# Patient Record
Sex: FEMALE | URBAN - METROPOLITAN AREA
[De-identification: names, ages, dates, MRNs, and addresses within clinical notes are randomized per-mention and may not be internally consistent; named-entity substitution may affect disease eponyms.]

---

## 2023-04-12 ENCOUNTER — POST-OPERATIVE VISIT (OUTPATIENT)
Facility: LOCATION | Age: 73
End: 2023-04-12

## 2023-04-12 DIAGNOSIS — Z48.810 ENCOUNTER FOR SURGICAL AFTERCARE FOLLOWING SURGERY ON A SENSE ORGAN: Primary | ICD-10-CM

## 2023-04-12 PROCEDURE — 99024 POSTOP FOLLOW-UP VISIT: CPT | Performed by: OPTOMETRIST

## 2023-04-12 ASSESSMENT — VISUAL ACUITY
OS: 20/20
OD: 20/20

## 2023-04-12 ASSESSMENT — KERATOMETRY: OD: 41.44

## 2023-04-12 NOTE — IMPRESSION/PLAN
Impression: S/P CM PRK [facility] OD OD - 152 Days. Encounter for surgical aftercare following surgery on a sense organ  Z48.810. 
s/p PRK OD after CEIOL. Residual prescription - cma Plan: reviewed with pt hyperopic treatment and expected myopic phase. appears that residual myopia is persistent instead of regressing as expected. Glasses rx released. Continue  artificial tears, Vitamin C, 100% UV A and B sunglasses while outdoors. RTC 2-3 months, check VA (OD, OS, OU), Ks, prism balanced MR, IOP and exam.
Once stable x 4 months consider enhancement to improve vision. RTC PRN decrease VA, increase pain, redness, discharge, photophobia, flashes/floaters or other vision problems.

## 2023-08-04 ENCOUNTER — OFFICE VISIT (OUTPATIENT)
Facility: LOCATION | Age: 73
End: 2023-08-04
Payer: COMMERCIAL

## 2023-08-04 DIAGNOSIS — H16.223 KERATOCONJUNCT SICCA, NOT SPECIFIED AS SJOGREN'S, BILATERAL: ICD-10-CM

## 2023-08-04 DIAGNOSIS — H50.05 ALTERNATING ESOTROPIA: ICD-10-CM

## 2023-08-04 DIAGNOSIS — H53.2 DIPLOPIA: Primary | ICD-10-CM

## 2023-08-04 DIAGNOSIS — H18.49 OTHER CORNEAL DEGENERATION: ICD-10-CM

## 2023-08-04 DIAGNOSIS — H04.123 DRY EYE SYNDROME OF BILATERAL LACRIMAL GLANDS: ICD-10-CM

## 2023-08-04 PROCEDURE — 99214 OFFICE O/P EST MOD 30 MIN: CPT | Performed by: OPHTHALMOLOGY

## 2023-08-09 ENCOUNTER — OFFICE VISIT (OUTPATIENT)
Facility: LOCATION | Age: 73
End: 2023-08-09
Payer: COMMERCIAL

## 2023-08-09 DIAGNOSIS — H50.05 ALTERNATING ESOTROPIA: ICD-10-CM

## 2023-08-09 DIAGNOSIS — H16.223 KERATOCONJUNCT SICCA, NOT SPECIFIED AS SJOGREN'S, BILATERAL: ICD-10-CM

## 2023-08-09 DIAGNOSIS — H04.123 DRY EYE SYNDROME OF BILATERAL LACRIMAL GLANDS: ICD-10-CM

## 2023-08-09 DIAGNOSIS — H53.2 DIPLOPIA: Primary | ICD-10-CM

## 2023-08-09 PROCEDURE — 99213 OFFICE O/P EST LOW 20 MIN: CPT | Performed by: OPHTHALMOLOGY

## 2023-08-09 ASSESSMENT — VISUAL ACUITY
OD: 20/20
OS: 20/30

## 2023-09-27 ENCOUNTER — OFFICE VISIT (OUTPATIENT)
Facility: LOCATION | Age: 73
End: 2023-09-27
Payer: COMMERCIAL

## 2023-09-27 DIAGNOSIS — H53.2 DIPLOPIA: Primary | ICD-10-CM

## 2023-09-27 DIAGNOSIS — H50.05 ALTERNATING ESOTROPIA: ICD-10-CM

## 2023-09-27 DIAGNOSIS — H04.123 DRY EYE SYNDROME OF BILATERAL LACRIMAL GLANDS: ICD-10-CM

## 2023-09-27 DIAGNOSIS — H16.223 KERATOCONJUNCT SICCA, NOT SPECIFIED AS SJOGREN'S, BILATERAL: ICD-10-CM

## 2023-09-27 PROCEDURE — 99213 OFFICE O/P EST LOW 20 MIN: CPT | Performed by: OPHTHALMOLOGY

## 2023-09-27 ASSESSMENT — INTRAOCULAR PRESSURE
OD: 10
OS: 10

## 2023-09-27 ASSESSMENT — VISUAL ACUITY
OD: 20/20
OS: 20/20

## 2023-10-04 ENCOUNTER — OFFICE VISIT (OUTPATIENT)
Facility: LOCATION | Age: 73
End: 2023-10-04
Payer: COMMERCIAL

## 2023-10-04 DIAGNOSIS — H04.123 DRY EYE SYNDROME OF BILATERAL LACRIMAL GLANDS: Primary | ICD-10-CM

## 2023-10-04 DIAGNOSIS — H16.223 KERATOCONJUNCT SICCA, NOT SPECIFIED AS SJOGREN'S, BILATERAL: ICD-10-CM

## 2023-10-04 DIAGNOSIS — H18.49 OTHER CORNEAL DEGENERATION: ICD-10-CM

## 2023-10-04 PROCEDURE — 99213 OFFICE O/P EST LOW 20 MIN: CPT | Performed by: OPHTHALMOLOGY

## 2023-10-04 ASSESSMENT — INTRAOCULAR PRESSURE
OS: 12
OD: 12

## 2023-10-18 ENCOUNTER — OFFICE VISIT (OUTPATIENT)
Facility: LOCATION | Age: 73
End: 2023-10-18
Payer: COMMERCIAL

## 2023-10-18 DIAGNOSIS — H18.49 OTHER CORNEAL DEGENERATION: ICD-10-CM

## 2023-10-18 DIAGNOSIS — H04.123 DRY EYE SYNDROME OF BILATERAL LACRIMAL GLANDS: Primary | ICD-10-CM

## 2023-10-18 ASSESSMENT — VISUAL ACUITY
OS: 20/20
OD: 20/20

## 2023-11-22 ENCOUNTER — Encounter (OUTPATIENT)
Facility: LOCATION | Age: 73
End: 2023-11-22

## 2023-11-30 ENCOUNTER — Encounter (OUTPATIENT)
Facility: LOCATION | Age: 73
End: 2023-11-30
Payer: COMMERCIAL

## 2023-12-01 ENCOUNTER — POST-OPERATIVE VISIT (OUTPATIENT)
Facility: LOCATION | Age: 73
End: 2023-12-01

## 2023-12-01 PROCEDURE — 99024 POSTOP FOLLOW-UP VISIT: CPT | Performed by: OPTOMETRIST

## 2023-12-04 ENCOUNTER — POST-OPERATIVE VISIT (OUTPATIENT)
Facility: LOCATION | Age: 73
End: 2023-12-04

## 2023-12-04 DIAGNOSIS — Z48.810 ENCOUNTER FOR SURGICAL AFTERCARE FOLLOWING SURGERY ON A SENSE ORGAN: Primary | ICD-10-CM

## 2023-12-04 PROCEDURE — 99024 POSTOP FOLLOW-UP VISIT: CPT | Performed by: OPTOMETRIST

## 2023-12-05 ENCOUNTER — POST-OPERATIVE VISIT (OUTPATIENT)
Facility: LOCATION | Age: 73
End: 2023-12-05

## 2023-12-05 PROCEDURE — 99024 POSTOP FOLLOW-UP VISIT: CPT | Performed by: OPTOMETRIST

## 2024-01-03 ENCOUNTER — POST-OPERATIVE VISIT (OUTPATIENT)
Facility: LOCATION | Age: 74
End: 2024-01-03

## 2024-01-03 PROCEDURE — 99024 POSTOP FOLLOW-UP VISIT: CPT | Performed by: OPTOMETRIST

## 2024-01-03 ASSESSMENT — KERATOMETRY
OS: 40.94
OD: 40.81

## 2024-01-03 ASSESSMENT — VISUAL ACUITY: OS: 20/25

## 2024-01-03 ASSESSMENT — INTRAOCULAR PRESSURE: OD: 10

## 2024-01-24 ENCOUNTER — OFFICE VISIT (OUTPATIENT)
Facility: LOCATION | Age: 74
End: 2024-01-24
Payer: COMMERCIAL

## 2024-01-24 DIAGNOSIS — H50.312 INTERMITTENT MONOCULAR ESOTROPIA, LEFT EYE: ICD-10-CM

## 2024-01-24 DIAGNOSIS — H53.2 DIPLOPIA: Primary | ICD-10-CM

## 2024-01-24 PROCEDURE — 99213 OFFICE O/P EST LOW 20 MIN: CPT | Performed by: OPHTHALMOLOGY

## 2024-01-24 PROCEDURE — 92134 CPTRZ OPH DX IMG PST SGM RTA: CPT | Performed by: OPHTHALMOLOGY

## 2024-01-24 ASSESSMENT — VISUAL ACUITY
OS: 20/25
OD: 20/30

## 2024-01-24 ASSESSMENT — INTRAOCULAR PRESSURE
OS: 10
OD: 10

## 2024-03-13 ENCOUNTER — OFFICE VISIT (OUTPATIENT)
Facility: LOCATION | Age: 74
End: 2024-03-13
Payer: MEDICARE

## 2024-03-13 DIAGNOSIS — H50.312 INTERMITTENT MONOCULAR ESOTROPIA, LEFT EYE: ICD-10-CM

## 2024-03-13 DIAGNOSIS — H04.123 DRY EYE SYNDROME OF BILATERAL LACRIMAL GLANDS: ICD-10-CM

## 2024-03-13 DIAGNOSIS — H53.2 DIPLOPIA: Primary | ICD-10-CM

## 2024-03-13 PROCEDURE — 99213 OFFICE O/P EST LOW 20 MIN: CPT | Performed by: OPHTHALMOLOGY

## 2024-03-13 ASSESSMENT — INTRAOCULAR PRESSURE
OD: 12
OS: 10

## 2024-05-08 ENCOUNTER — OFFICE VISIT (OUTPATIENT)
Facility: LOCATION | Age: 74
End: 2024-05-08
Payer: MEDICARE

## 2024-05-08 DIAGNOSIS — H50.312 INTERMITTENT MONOCULAR ESOTROPIA, LEFT EYE: ICD-10-CM

## 2024-05-08 DIAGNOSIS — H04.123 DRY EYE SYNDROME OF BILATERAL LACRIMAL GLANDS: ICD-10-CM

## 2024-05-08 DIAGNOSIS — H53.2 DIPLOPIA: Primary | ICD-10-CM

## 2024-05-08 PROCEDURE — 83861 MICROFLUID ANALY TEARS: CPT | Performed by: OPHTHALMOLOGY

## 2024-05-08 PROCEDURE — 99214 OFFICE O/P EST MOD 30 MIN: CPT | Performed by: OPHTHALMOLOGY

## 2024-05-08 ASSESSMENT — INTRAOCULAR PRESSURE
OS: 13
OD: 11

## 2024-08-21 ENCOUNTER — OFFICE VISIT (OUTPATIENT)
Facility: LOCATION | Age: 74
End: 2024-08-21
Payer: MEDICARE

## 2024-08-21 DIAGNOSIS — H50.312 INTERMITTENT MONOCULAR ESOTROPIA, LEFT EYE: ICD-10-CM

## 2024-08-21 DIAGNOSIS — H04.123 DRY EYE SYNDROME OF BILATERAL LACRIMAL GLANDS: ICD-10-CM

## 2024-08-21 DIAGNOSIS — H53.2 DIPLOPIA: ICD-10-CM

## 2024-08-21 DIAGNOSIS — H27.132 POSTERIOR DISLOCATION OF LENS, LEFT EYE: Primary | ICD-10-CM

## 2024-08-21 PROCEDURE — 99213 OFFICE O/P EST LOW 20 MIN: CPT | Performed by: OPHTHALMOLOGY

## 2024-08-21 ASSESSMENT — INTRAOCULAR PRESSURE
OS: 11
OD: 11

## 2024-08-30 ENCOUNTER — OFFICE VISIT (OUTPATIENT)
Facility: LOCATION | Age: 74
End: 2024-08-30
Payer: MEDICARE

## 2024-08-30 DIAGNOSIS — H27.132 POSTERIOR DISLOCATION OF LENS, LEFT EYE: Primary | ICD-10-CM

## 2024-08-30 PROCEDURE — 99213 OFFICE O/P EST LOW 20 MIN: CPT | Performed by: OPHTHALMOLOGY

## 2024-08-30 PROCEDURE — 92136 OPHTHALMIC BIOMETRY: CPT | Performed by: OPHTHALMOLOGY

## 2024-08-30 ASSESSMENT — INTRAOCULAR PRESSURE
OS: 11
OD: 11

## 2024-11-13 ENCOUNTER — OFFICE VISIT (OUTPATIENT)
Facility: LOCATION | Age: 74
End: 2024-11-13
Payer: MEDICARE

## 2024-11-13 DIAGNOSIS — H27.132 POSTERIOR DISLOCATION OF LENS, LEFT EYE: Primary | ICD-10-CM

## 2024-11-13 DIAGNOSIS — H04.123 DRY EYE SYNDROME OF BILATERAL LACRIMAL GLANDS: ICD-10-CM

## 2024-11-13 PROCEDURE — 99213 OFFICE O/P EST LOW 20 MIN: CPT | Performed by: OPHTHALMOLOGY

## 2024-11-13 ASSESSMENT — VISUAL ACUITY: OS: 20/25

## 2024-11-13 ASSESSMENT — INTRAOCULAR PRESSURE
OS: 19
OD: 12